# Patient Record
Sex: FEMALE
[De-identification: names, ages, dates, MRNs, and addresses within clinical notes are randomized per-mention and may not be internally consistent; named-entity substitution may affect disease eponyms.]

---

## 2023-03-12 ENCOUNTER — NURSE TRIAGE (OUTPATIENT)
Dept: OTHER | Facility: CLINIC | Age: 88
End: 2023-03-12

## 2023-03-12 NOTE — TELEPHONE ENCOUNTER
Location of patient: [place]    Subjective: Caller states \"About a month ago I had gout in my foot. They gave me steroids, but no instructions. A week or so ago, I got something wrong with my finger. I figured it was arthritis. But they checked the blood and stated it was a build up of uric acid. I haven't been able to sleep. What are the side effects of allopurinol? \"     Denied any thoughts of suicide, concerns with heart such as palpitations at present time    Gave caller a couple of general adverse effects listed by up to date. Encouraged caller to speak with PCP when office opened to discuss side effects of medication and possible alternatives. Recommended disposition:  Call PCP when office is open. Care advice provided, patient verbalizes understanding; denies any other questions or concerns; instructed to call back for any new or worsening symptoms. Patient/caller agrees to follow-up with PCP     This triage is a result of a call to [address] [place] [address]. Please do not respond to the triage nurse through this encounter. Any subsequent communication should be directly with the patient.     Reason for Disposition   [1] Caller has NON-URGENT medicine question about med that PCP prescribed AND [2] triager unable to answer question    Protocols used: Medication Question Call-ADULT-